# Patient Record
Sex: MALE | Race: OTHER | HISPANIC OR LATINO | URBAN - METROPOLITAN AREA
[De-identification: names, ages, dates, MRNs, and addresses within clinical notes are randomized per-mention and may not be internally consistent; named-entity substitution may affect disease eponyms.]

---

## 2017-05-26 ENCOUNTER — EMERGENCY (EMERGENCY)
Facility: HOSPITAL | Age: 72
LOS: 1 days | Discharge: HOME | End: 2017-05-26
Attending: EMERGENCY MEDICINE | Admitting: EMERGENCY MEDICINE
Payer: OTHER MISCELLANEOUS

## 2017-05-26 VITALS
SYSTOLIC BLOOD PRESSURE: 111 MMHG | DIASTOLIC BLOOD PRESSURE: 74 MMHG | RESPIRATION RATE: 14 BRPM | HEART RATE: 88 BPM | TEMPERATURE: 99 F | OXYGEN SATURATION: 100 %

## 2017-05-26 VITALS — HEIGHT: 70 IN | WEIGHT: 160.06 LBS

## 2017-05-26 DIAGNOSIS — S62.666A NONDISPLACED FRACTURE OF DISTAL PHALANX OF RIGHT LITTLE FINGER, INITIAL ENCOUNTER FOR CLOSED FRACTURE: ICD-10-CM

## 2017-05-26 DIAGNOSIS — Z79.899 OTHER LONG TERM (CURRENT) DRUG THERAPY: ICD-10-CM

## 2017-05-26 DIAGNOSIS — M79.644 PAIN IN RIGHT FINGER(S): ICD-10-CM

## 2017-05-26 PROCEDURE — 73130 X-RAY EXAM OF HAND: CPT | Mod: 26,RT

## 2017-05-26 PROCEDURE — 26750 TREAT FINGER FRACTURE EACH: CPT | Mod: 54

## 2017-05-26 PROCEDURE — 99284 EMERGENCY DEPT VISIT MOD MDM: CPT | Mod: 25

## 2017-05-26 RX ORDER — METFORMIN HYDROCHLORIDE 850 MG/1
0 TABLET ORAL
Qty: 0 | Refills: 0 | COMMUNITY

## 2017-05-26 NOTE — ED PROVIDER NOTE - MUSCULOSKELETAL, MLM
Swelling and tenderness along the right 5th DIP, pain with flexion at the DIP, no PIP or MCP tenderness.

## 2017-05-26 NOTE — ED PROVIDER NOTE - CARE PLAN
Principal Discharge DX:	Closed nondisplaced fracture of distal phalanx of right little finger, initial encounter

## 2017-05-26 NOTE — ED ADULT NURSE NOTE - CHPI ED SYMPTOMS NEG
no weakness/no vomiting/no chills/no decreased eating/drinking/no numbness/no dizziness/no tingling/no fever/no nausea

## 2017-05-26 NOTE — ED PROVIDER NOTE - NS ED MD SCRIBE ATTENDING SCRIBE SECTIONS
VITAL SIGNS( Pullset)/HISTORY OF PRESENT ILLNESS/PHYSICAL EXAM/PAST MEDICAL/SURGICAL/SOCIAL HISTORY/HIV/INTAKE ASSESSMENT/SCREENINGS/REVIEW OF SYSTEMS

## 2017-05-26 NOTE — ED PROVIDER NOTE - OBJECTIVE STATEMENT
71y RHD M Pt presents to ED with tenderness and swelling to right 5th finger s/p trauma yesterday. Pt states he was moving heavy crates when he extended his finger and felt pain. Unsure if he heard a popping sound or felt a popping sensation. Swelling began later that evening and Pt presents today for evaluation.

## 2017-05-26 NOTE — ED PROVIDER NOTE - MEDICAL DECISION MAKING DETAILS
71y RHD M with right 5th distal finger injury, occurred yesterday, unknown mechanism of injury. Pain and swelling limited to DIP with limited flexion due to pain. Possible FDP injury. Ice and x-ray ordered.

## 2017-05-26 NOTE — ED PROVIDER NOTE - DIAGNOSTIC INTERPRETATION
X-ray right hand, 3 views - INTERPRETATION: fracture to distal right 5th phalanx, positive soft tissue swelling, no dislocations

## 2017-05-26 NOTE — ED ADULT NURSE NOTE - OBJECTIVE STATEMENT
" I was moving cases of wine and beer when I hurt my finger" pt. able to move digits. c/o right pinky and hand pain

## 2018-01-10 ENCOUNTER — EMERGENCY (EMERGENCY)
Facility: HOSPITAL | Age: 73
LOS: 1 days | Discharge: HOME | End: 2018-01-10
Attending: EMERGENCY MEDICINE | Admitting: EMERGENCY MEDICINE
Payer: COMMERCIAL

## 2018-01-10 VITALS
SYSTOLIC BLOOD PRESSURE: 111 MMHG | DIASTOLIC BLOOD PRESSURE: 71 MMHG | HEART RATE: 66 BPM | RESPIRATION RATE: 18 BRPM | OXYGEN SATURATION: 99 % | TEMPERATURE: 98 F

## 2018-01-10 DIAGNOSIS — E11.9 TYPE 2 DIABETES MELLITUS WITHOUT COMPLICATIONS: ICD-10-CM

## 2018-01-10 DIAGNOSIS — R07.81 PLEURODYNIA: ICD-10-CM

## 2018-01-10 DIAGNOSIS — Z79.899 OTHER LONG TERM (CURRENT) DRUG THERAPY: ICD-10-CM
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
## 2018-01-10 DIAGNOSIS — S22.31XA FRACTURE OF ONE RIB, RIGHT SIDE, INITIAL ENCOUNTER FOR CLOSED FRACTURE: ICD-10-CM
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
## 2018-01-10 PROCEDURE — 71101 X-RAY EXAM UNILAT RIBS/CHEST: CPT | Mod: 26,RT

## 2018-01-10 PROCEDURE — 99284 EMERGENCY DEPT VISIT MOD MDM: CPT

## 2018-01-10 RX ORDER — OXYCODONE HYDROCHLORIDE 5 MG/1
1 TABLET ORAL
Qty: 20 | Refills: 0 | OUTPATIENT
Start: 2018-01-10 | End: 2018-01-14

## 2018-01-10 RX ORDER — IBUPROFEN 200 MG
1 TABLET ORAL
Qty: 28 | Refills: 0 | OUTPATIENT
Start: 2018-01-10 | End: 2018-01-16

## 2018-01-10 NOTE — ED PROVIDER NOTE - OBJECTIVE STATEMENT
72y M with hx of cirrhosis, presents to ED with right rib pain s/p fall. Pt states he slipped and fell 4 days ago, falling onto his right side. No LOC, no head trauma. He felt no pain until yesterday, when he bent over to get something at work, which caused him to feel pain in his right ribs. Pt states he found it difficult to sleep on his right side last night. No f/c, no pain when he takes deep breaths.

## 2018-01-10 NOTE — ED ADULT NURSE NOTE - CHPI ED SYMPTOMS NEG
no tingling/no bleeding/no deformity/no abrasion/no vomiting/no numbness/no confusion/no loss of consciousness/no weakness/no fever

## 2018-01-10 NOTE — ED ADULT TRIAGE NOTE - CHIEF COMPLAINT QUOTE
Patient to ED with right sided rib and back pain s/p fall on Saturday.  Pain worsening.  No distress

## 2018-06-08 ENCOUNTER — EMERGENCY (EMERGENCY)
Facility: HOSPITAL | Age: 73
LOS: 1 days | Discharge: HOME | End: 2018-06-08
Admitting: EMERGENCY MEDICINE
Payer: COMMERCIAL

## 2018-06-08 VITALS
TEMPERATURE: 98 F | SYSTOLIC BLOOD PRESSURE: 102 MMHG | RESPIRATION RATE: 20 BRPM | DIASTOLIC BLOOD PRESSURE: 65 MMHG | WEIGHT: 160.06 LBS | OXYGEN SATURATION: 100 % | HEART RATE: 58 BPM

## 2018-06-08 LAB
ALBUMIN SERPL ELPH-MCNC: 3.8 G/DL — SIGNIFICANT CHANGE UP (ref 3.4–5)
ALP SERPL-CCNC: 52 U/L — SIGNIFICANT CHANGE UP (ref 40–120)
ALT FLD-CCNC: 32 U/L — SIGNIFICANT CHANGE UP (ref 12–42)
ANION GAP SERPL CALC-SCNC: 8 MMOL/L — LOW (ref 9–16)
APTT BLD: 26.1 SEC — LOW (ref 27.5–36.5)
AST SERPL-CCNC: 28 U/L — SIGNIFICANT CHANGE UP (ref 15–37)
BASOPHILS NFR BLD AUTO: 0.5 % — SIGNIFICANT CHANGE UP (ref 0–2)
BILIRUB SERPL-MCNC: 0.4 MG/DL — SIGNIFICANT CHANGE UP (ref 0.2–1.2)
BUN SERPL-MCNC: 16 MG/DL — SIGNIFICANT CHANGE UP (ref 7–23)
CALCIUM SERPL-MCNC: 9 MG/DL — SIGNIFICANT CHANGE UP (ref 8.5–10.5)
CHLORIDE SERPL-SCNC: 104 MMOL/L — SIGNIFICANT CHANGE UP (ref 96–108)
CO2 SERPL-SCNC: 25 MMOL/L — SIGNIFICANT CHANGE UP (ref 22–31)
CREAT SERPL-MCNC: 0.96 MG/DL — SIGNIFICANT CHANGE UP (ref 0.5–1.3)
EOSINOPHIL NFR BLD AUTO: 1.2 % — SIGNIFICANT CHANGE UP (ref 0–6)
GLUCOSE SERPL-MCNC: 196 MG/DL — HIGH (ref 70–99)
HCT VFR BLD CALC: 46.2 % — SIGNIFICANT CHANGE UP (ref 39–50)
HGB BLD-MCNC: 15.1 G/DL — SIGNIFICANT CHANGE UP (ref 13–17)
IMM GRANULOCYTES NFR BLD AUTO: 0.4 % — SIGNIFICANT CHANGE UP (ref 0–1.5)
INR BLD: 1 — SIGNIFICANT CHANGE UP (ref 0.88–1.16)
LACTATE SERPL-SCNC: 2 MMOL/L — SIGNIFICANT CHANGE UP (ref 0.4–2)
LIDOCAIN IGE QN: 199 U/L — SIGNIFICANT CHANGE UP (ref 73–393)
LYMPHOCYTES # BLD AUTO: 13.6 % — SIGNIFICANT CHANGE UP (ref 13–44)
MCHC RBC-ENTMCNC: 29.6 PG — SIGNIFICANT CHANGE UP (ref 27–34)
MCHC RBC-ENTMCNC: 32.7 G/DL — SIGNIFICANT CHANGE UP (ref 32–36)
MCV RBC AUTO: 90.6 FL — SIGNIFICANT CHANGE UP (ref 80–100)
MONOCYTES NFR BLD AUTO: 4.3 % — SIGNIFICANT CHANGE UP (ref 2–14)
NEUTROPHILS NFR BLD AUTO: 80 % — HIGH (ref 43–77)
PLATELET # BLD AUTO: 137 K/UL — LOW (ref 150–400)
POTASSIUM SERPL-MCNC: 3.9 MMOL/L — SIGNIFICANT CHANGE UP (ref 3.5–5.3)
POTASSIUM SERPL-SCNC: 3.9 MMOL/L — SIGNIFICANT CHANGE UP (ref 3.5–5.3)
PROT SERPL-MCNC: 7.9 G/DL — SIGNIFICANT CHANGE UP (ref 6.4–8.2)
PROTHROM AB SERPL-ACNC: 11 SEC — SIGNIFICANT CHANGE UP (ref 9.8–12.7)
RBC # BLD: 5.1 M/UL — SIGNIFICANT CHANGE UP (ref 4.2–5.8)
RBC # FLD: 12.4 % — SIGNIFICANT CHANGE UP (ref 10.3–16.9)
SODIUM SERPL-SCNC: 137 MMOL/L — SIGNIFICANT CHANGE UP (ref 132–145)
TROPONIN I SERPL-MCNC: <0.017 NG/ML — LOW (ref 0.02–0.06)
WBC # BLD: 12.9 K/UL — HIGH (ref 3.8–10.5)
WBC # FLD AUTO: 12.9 K/UL — HIGH (ref 3.8–10.5)

## 2018-06-08 PROCEDURE — 99285 EMERGENCY DEPT VISIT HI MDM: CPT | Mod: 25

## 2018-06-08 PROCEDURE — 71045 X-RAY EXAM CHEST 1 VIEW: CPT | Mod: 26

## 2018-06-08 PROCEDURE — 93010 ELECTROCARDIOGRAM REPORT: CPT

## 2018-06-08 PROCEDURE — 74177 CT ABD & PELVIS W/CONTRAST: CPT | Mod: 26

## 2018-06-08 RX ORDER — ONDANSETRON 8 MG/1
4 TABLET, FILM COATED ORAL ONCE
Qty: 0 | Refills: 0 | Status: COMPLETED | OUTPATIENT
Start: 2018-06-08 | End: 2018-06-08

## 2018-06-08 RX ORDER — MOXIFLOXACIN HYDROCHLORIDE TABLETS, 400 MG 400 MG/1
1 TABLET, FILM COATED ORAL
Qty: 13 | Refills: 0 | OUTPATIENT
Start: 2018-06-08 | End: 2018-06-14

## 2018-06-08 RX ORDER — CIPROFLOXACIN LACTATE 400MG/40ML
500 VIAL (ML) INTRAVENOUS ONCE
Qty: 0 | Refills: 0 | Status: COMPLETED | OUTPATIENT
Start: 2018-06-08 | End: 2018-06-08

## 2018-06-08 RX ORDER — KETOROLAC TROMETHAMINE 30 MG/ML
15 SYRINGE (ML) INJECTION ONCE
Qty: 0 | Refills: 0 | Status: DISCONTINUED | OUTPATIENT
Start: 2018-06-08 | End: 2018-06-08

## 2018-06-08 RX ORDER — METRONIDAZOLE 500 MG
1 TABLET ORAL
Qty: 13 | Refills: 0 | OUTPATIENT
Start: 2018-06-08 | End: 2018-06-14

## 2018-06-08 RX ORDER — IOHEXOL 300 MG/ML
50 INJECTION, SOLUTION INTRAVENOUS ONCE
Qty: 0 | Refills: 0 | Status: COMPLETED | OUTPATIENT
Start: 2018-06-08 | End: 2018-06-08

## 2018-06-08 RX ORDER — METRONIDAZOLE 500 MG
500 TABLET ORAL ONCE
Qty: 0 | Refills: 0 | Status: COMPLETED | OUTPATIENT
Start: 2018-06-08 | End: 2018-06-08

## 2018-06-08 RX ADMIN — ONDANSETRON 4 MILLIGRAM(S): 8 TABLET, FILM COATED ORAL at 07:24

## 2018-06-08 RX ADMIN — IOHEXOL 50 MILLILITER(S): 300 INJECTION, SOLUTION INTRAVENOUS at 07:35

## 2018-06-08 RX ADMIN — Medication 500 MILLIGRAM(S): at 11:30

## 2018-06-08 RX ADMIN — Medication 15 MILLIGRAM(S): at 07:24

## 2018-06-08 RX ADMIN — Medication 15 MILLIGRAM(S): at 07:39

## 2018-06-08 NOTE — ED ADULT NURSE NOTE - OBJECTIVE STATEMENT
pt presents to ED with c/o bloating and diffuse abdominal pain, hx of hep c treated with Harvoni & prostatectomy. Pt reports mild nausea, abdomen diffusely tender and softly distended.

## 2018-06-08 NOTE — ED ADULT TRIAGE NOTE - CHIEF COMPLAINT QUOTE
Pt ambulatory, complaining of sudden onset diffused abdominal pain this morning; with nausea, no vomiting. Reports hx of cirrhosis.

## 2018-06-08 NOTE — ED PROVIDER NOTE - OBJECTIVE STATEMENT
72 year old male with h/o cirrhosis and DM presents with onset of upper abdominal pain x this morning. he reports pain began after he ate breakfast, went to work and felt like pain was improving then worsened again so decided to come to ED. reports no history of pain like this in the past. associated with mild nausea.  Denies fever, chills, N/V/D/C, melena, hematochezia, hematuria, change in urinary/bowel function, dysuria, d/c, flank pain, HA, dizziness, focal weakness, CP, SOB, palpitations, cough, and malaise.

## 2018-06-08 NOTE — ED PROVIDER NOTE - PHYSICAL EXAMINATION
VITAL SIGNS: I have reviewed nursing notes and confirm.  CONSTITUTIONAL: Well-developed; well-nourished; in no acute distress.  SKIN: Skin is warm and dry, no acute rash.  HEAD: Normocephalic; atraumatic.  EYES: PERRL, EOM intact; conjunctiva and sclera clear.  ENT: No nasal discharge; airway clear.  NECK: Supple; non tender.  CARD: S1, S2 normal; no murmurs, gallops, or rubs. Regular rate and rhythm.  RESP: No wheezes, rales or rhonchi.  ABD: Normal bowel sounds; soft; non-distended; diffuse abdominal tenderness, no guarding, no rebound tenderness; no hepatosplenomegaly.  EXT: Normal ROM. No clubbing, cyanosis or edema.  NEURO: Alert, oriented. Grossly unremarkable.  PSYCH: Cooperative, appropriate.

## 2018-06-08 NOTE — ED PROVIDER NOTE - PROGRESS NOTE DETAILS
SEGUNDO Morales endorsed patient to day team pending labs, re-evaluation, and disposition SEGUNDO Morales endorsed patient to day team pending labs/CT/CXR, re-evaluation, and disposition CT shows functional ileus with enterocolitis.  WBC 12.  Will treat as infectious with cipro and flagyl  Pt tolerating PO.  Will discharge home with GI referral and return precautions.

## 2018-06-08 NOTE — ED ADULT NURSE NOTE - CHPI ED SYMPTOMS NEG
no abdominal distension/no vomiting/no dysuria/no diarrhea/no fever/no chills/no blood in stool/no hematuria/no burning urination

## 2018-06-08 NOTE — ED PROVIDER NOTE - NS ED ROS FT
· CONSTITUTIONAL: no fever and no chills.  · CARDIOVASCULAR: normal rate and rhythm, no chest pain and no edema.  · RESPIRATORY: no chest pain, no cough, and no shortness of breath.  · GASTROINTESTINAL: + abdominal pain, no bloating, no constipation, no diarrhea, + nausea and no vomiting.  · MUSCULOSKELETAL: no back pain, no musculoskeletal pain, no neck pain, and no weakness.  · SKIN: no abrasions, no jaundice, no lesions, no pruritis, and no rashes.  · NEURO: no loss of consciousness, no gait abnormality, no headache, no sensory deficits, and no weakness.  · PSYCHIATRIC: no known mental health issues.

## 2018-06-12 DIAGNOSIS — Z79.84 LONG TERM (CURRENT) USE OF ORAL HYPOGLYCEMIC DRUGS: ICD-10-CM

## 2018-06-12 DIAGNOSIS — E11.9 TYPE 2 DIABETES MELLITUS WITHOUT COMPLICATIONS: ICD-10-CM

## 2018-06-12 DIAGNOSIS — Z79.891 LONG TERM (CURRENT) USE OF OPIATE ANALGESIC: ICD-10-CM

## 2018-06-12 DIAGNOSIS — Z79.1 LONG TERM (CURRENT) USE OF NON-STEROIDAL ANTI-INFLAMMATORIES (NSAID): ICD-10-CM

## 2018-06-12 DIAGNOSIS — K52.9 NONINFECTIVE GASTROENTERITIS AND COLITIS, UNSPECIFIED: ICD-10-CM

## 2018-06-12 DIAGNOSIS — R10.10 UPPER ABDOMINAL PAIN, UNSPECIFIED: ICD-10-CM

## 2019-04-16 ENCOUNTER — EMERGENCY (EMERGENCY)
Facility: HOSPITAL | Age: 74
LOS: 1 days | Discharge: HOME | End: 2019-04-16
Admitting: EMERGENCY MEDICINE
Payer: COMMERCIAL

## 2019-04-16 VITALS
HEART RATE: 64 BPM | RESPIRATION RATE: 16 BRPM | WEIGHT: 169.98 LBS | TEMPERATURE: 98 F | DIASTOLIC BLOOD PRESSURE: 66 MMHG | SYSTOLIC BLOOD PRESSURE: 98 MMHG | OXYGEN SATURATION: 95 %

## 2019-04-16 PROCEDURE — 73660 X-RAY EXAM OF TOE(S): CPT | Mod: 26,LT

## 2019-04-16 PROCEDURE — 99283 EMERGENCY DEPT VISIT LOW MDM: CPT

## 2019-04-16 RX ORDER — CEPHALEXIN 500 MG
1 CAPSULE ORAL
Qty: 28 | Refills: 0 | OUTPATIENT
Start: 2019-04-16 | End: 2019-04-22

## 2019-04-16 NOTE — ED PROVIDER NOTE - CLINICAL SUMMARY MEDICAL DECISION MAKING FREE TEXT BOX
patient PMHx DM with L 5th toe pain after hitting foot against edge of wall. digit edematous, + erythema, XR WNL. will tx as early cellulitis. advised close follow up with PCP this week

## 2019-04-16 NOTE — ED PROVIDER NOTE - PHYSICAL EXAMINATION
L foot: edema over 5th DIP with dry abrasion in the web space btw 4th and 5th toe, TTP, dry, mild erythema  DP pulses equal. cap refill < 2 sec

## 2019-04-16 NOTE — ED PROVIDER NOTE - OBJECTIVE STATEMENT
PMHx DM presents with L 5th toe pain and swelling x 4 days after running into the edge of a door between the webspace of 4th and 5th toe. denies paresthesias or focal weakness. denies fever, chills, nightsweats

## 2019-04-17 PROBLEM — K74.60 UNSPECIFIED CIRRHOSIS OF LIVER: Chronic | Status: ACTIVE | Noted: 2017-05-26

## 2019-04-17 PROBLEM — E11.9 TYPE 2 DIABETES MELLITUS WITHOUT COMPLICATIONS: Chronic | Status: ACTIVE | Noted: 2018-01-10

## 2019-04-18 NOTE — ED ADULT NURSE NOTE - NSFALLRSKASSESSDT_ED_ALL_ED
I spoke with Mildred and verbalized understanding of message.  
Mildred from physical therapy called office requesting for pt to continue with physical therapy. She states pt still need work on balance and strengthening. She can be reach at [phone] she can take verbal order as well.   
Okay to continue physical therapy  
16-Apr-2019 17:05

## 2019-04-18 NOTE — ED POST DISCHARGE NOTE - DETAILS
This writer spoke to the patient after his identity was confirmed and patient stated he came to the Ed yesterday and received a copy of them

## 2019-04-20 DIAGNOSIS — Z79.1 LONG TERM (CURRENT) USE OF NON-STEROIDAL ANTI-INFLAMMATORIES (NSAID): ICD-10-CM

## 2019-04-20 DIAGNOSIS — M79.675 PAIN IN LEFT TOE(S): ICD-10-CM

## 2019-04-20 DIAGNOSIS — L03.032 CELLULITIS OF LEFT TOE: ICD-10-CM

## 2019-04-20 DIAGNOSIS — E11.9 TYPE 2 DIABETES MELLITUS WITHOUT COMPLICATIONS: ICD-10-CM

## 2019-04-20 DIAGNOSIS — Z79.2 LONG TERM (CURRENT) USE OF ANTIBIOTICS: ICD-10-CM

## 2019-11-25 NOTE — ED ADULT TRIAGE NOTE - MEANS OF ARRIVAL
Patient states she was told to see dr Christiano Lombardi, when called she was told she needs an referral to schedule first and that she would not be able to get seen until January. Patient would like that  referral to be put in. Patient can be reached at [phone] to discuss.    ambulatory

## 2021-09-07 ENCOUNTER — EMERGENCY (EMERGENCY)
Facility: HOSPITAL | Age: 76
LOS: 1 days | Discharge: HOME | End: 2021-09-07
Attending: EMERGENCY MEDICINE | Admitting: EMERGENCY MEDICINE
Payer: MEDICARE

## 2021-09-07 VITALS
DIASTOLIC BLOOD PRESSURE: 70 MMHG | WEIGHT: 154.98 LBS | TEMPERATURE: 98 F | HEART RATE: 68 BPM | SYSTOLIC BLOOD PRESSURE: 118 MMHG | HEIGHT: 70 IN | OXYGEN SATURATION: 95 % | RESPIRATION RATE: 16 BRPM

## 2021-09-07 DIAGNOSIS — K74.60 UNSPECIFIED CIRRHOSIS OF LIVER: ICD-10-CM

## 2021-09-07 DIAGNOSIS — L02.512 CUTANEOUS ABSCESS OF LEFT HAND: ICD-10-CM

## 2021-09-07 DIAGNOSIS — E11.9 TYPE 2 DIABETES MELLITUS WITHOUT COMPLICATIONS: ICD-10-CM

## 2021-09-07 DIAGNOSIS — L72.3 SEBACEOUS CYST: ICD-10-CM

## 2021-09-07 PROCEDURE — 99283 EMERGENCY DEPT VISIT LOW MDM: CPT

## 2021-09-07 RX ORDER — AZTREONAM 2 G
1 VIAL (EA) INJECTION
Qty: 14 | Refills: 0
Start: 2021-09-07 | End: 2021-09-13

## 2021-09-07 RX ORDER — CEPHALEXIN 500 MG
1 CAPSULE ORAL
Qty: 28 | Refills: 0
Start: 2021-09-07 | End: 2021-09-13

## 2021-09-07 RX ORDER — CEPHALEXIN 500 MG
500 CAPSULE ORAL ONCE
Refills: 0 | Status: COMPLETED | OUTPATIENT
Start: 2021-09-07 | End: 2021-09-07

## 2021-09-07 RX ADMIN — Medication 500 MILLIGRAM(S): at 17:19

## 2021-09-07 RX ADMIN — Medication 1 TABLET(S): at 17:18

## 2021-09-07 NOTE — ED PROVIDER NOTE - PROVIDER TOKENS
PROVIDER:[TOKEN:[[id number]],FOLLOWUP:[1-3 Days]],PROVIDER:[TOKEN:[[id number]],FOLLOWUP:[1-3 Days]]

## 2021-09-07 NOTE — ED PROVIDER NOTE - OBJECTIVE STATEMENT
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  Pt p/w ~1cm swelling to his L 1st web space.  Pt reports an abscess to the exact same location in the past and caused LUE cellulitis.  Pt noted swelling a few days ago.  Denies any trauma to the area.  Denies any redness, streaking, fevers, chills, or other concerns.

## 2021-09-07 NOTE — ED PROVIDER NOTE - CLINICAL SUMMARY MEDICAL DECISION MAKING FREE TEXT BOX
Cystic structure I&D'd.  Cheesy wax like material with minimal pus obtained.  Concerning for sebaceous cyst.  Too small to pack.  Started on PO Abx.  Will have him return here in 48 hours for wound check.  Strict instructions given to f/u with hand surgeon as cyst is likely to recur.  Infectious return precautions discussed.

## 2021-09-07 NOTE — ED ADULT TRIAGE NOTE - CHIEF COMPLAINT QUOTE
Pt presenting with swelling and tenderness to area between Lt index finger and thumb. Reports developing abscess to same area in the past which evolved into cellulitis.

## 2021-09-07 NOTE — ED PROVIDER NOTE - PATIENT PORTAL LINK FT
You can access the FollowMyHealth Patient Portal offered by [facility] by registering at the following website: [email or web address]. By joining [employer]’s FollowMyHealth portal, you will also be able to view your health information using other applications (apps) compatible with our system.

## 2021-09-07 NOTE — ED ADULT NURSE NOTE - NSIMPLEMENTINTERV_GEN_ALL_ED
Implemented All Universal Safety Interventions:  [place] to call system. Call bell, personal items and telephone within reach. Instruct patient to call for assistance. Room bathroom lighting operational. Non-slip footwear when patient is off stretcher. Physically safe environment: no spills, clutter or unnecessary equipment. Stretcher in lowest position, wheels locked, appropriate side rails in place.

## 2021-09-07 NOTE — ED PROVIDER NOTE - CARE PROVIDER_API CALL
Romario Lee)  Plastic Surgery; Surgery  [address] [place] [address]  Phone: [phone]  Fax: [phone]  Follow Up Time: 1-3 Days    Nestor Loja)  Plastic Surgery  [address], [address]  [place] [address]  Phone: [phone]  Fax: [phone]  Follow Up Time: 1-3 Days

## 2021-09-07 NOTE — ED PROVIDER NOTE - NSFOLLOWUPINSTRUCTIONS_ED_ALL_ED_FT
PLEASE RETURN IN 2 DAYS FOR A WOUND CHECK.     PLEASE GO TO YOUR PHARMACY TO FILL YOUR PRESCRIPTIONS.  PLEASE START TODAY AND USE AS DIRECTED.    PLEASE RETURN TO THE ER IMMEDIATELY FOR ANY REDNESS, SWELLING, SEVERE PAIN, PUS DRAINAGE, FEVER, OR ANY OTHER CONCERNS FOR INFECTION/WORSENING.    PLEASE FOLLOW UP WITH ONE OF OUR HAND DOCTORS (SEE ABOVE) AS THIS CYST IS LIKELY TO COME BACK.

## 2021-09-07 NOTE — ED PROVIDER NOTE - PHYSICAL EXAMINATION
VITAL SIGNS: I have reviewed nursing notes and confirm.  CONSTITUTIONAL: Well-developed; well-nourished; in no acute distress.  SKIN: ~1x1cm spherical   HEAD: Normocephalic; atraumatic.  EYES: PERRL, EOM intact; conjunctiva and sclera clear.  ENT: No nasal discharge; airway clear.  NECK: Supple; non tender.  CARD: S1, S2 normal; no murmurs, gallops, or rubs. Regular rate and rhythm.  RESP: No wheezes, rales or rhonchi.  ABD: Normal bowel sounds; soft; non-distended; non-tender; no hepatosplenomegaly.  MSK: Normal ROM. No clubbing, cyanosis or edema.  NEURO: Alert, oriented. Grossly unremarkable.  PSYCH: Cooperative, appropriate. VITAL SIGNS: I have reviewed nursing notes and confirm.  CONSTITUTIONAL: Well-developed; well-nourished; in no acute distress.  SKIN: ~1x1cm spherical cyst to L 1st webspace with mild induration and minimal erythema (<1cm).    HEAD: Normocephalic; atraumatic.  EYES: PERRL, EOM intact; conjunctiva and sclera clear.  ENT: No nasal discharge; airway clear.  NECK: Supple; non tender.  CARD: S1, S2 normal; no murmurs, gallops, or rubs. Regular rate and rhythm.  RESP: No wheezes, rales or rhonchi.  ABD: Normal bowel sounds; soft; non-distended; non-tender; no hepatosplenomegaly.  MSK: Normal ROM. No clubbing, cyanosis or edema.  NEURO: Alert, oriented. Grossly unremarkable.  PSYCH: Cooperative, appropriate.

## 2021-10-10 ENCOUNTER — TRANSCRIPTION ENCOUNTER (OUTPATIENT)
Age: 76
End: 2021-10-10

## 2021-10-11 ENCOUNTER — OUTPATIENT (OUTPATIENT)
Dept: OUTPATIENT SERVICES | Facility: HOSPITAL | Age: 76
LOS: 1 days | Discharge: HOME | End: 2021-10-11
Payer: MEDICARE

## 2021-10-11 ENCOUNTER — RESULT REVIEW (OUTPATIENT)
Age: 76
End: 2021-10-11

## 2021-10-11 LAB — GLUCOSE BLDC GLUCOMTR-MCNC: 135 MG/DL — HIGH (ref 70–99)

## 2021-10-11 PROCEDURE — 88307 TISSUE EXAM BY PATHOLOGIST: CPT | Mod: 26

## 2021-10-21 LAB — SURGICAL PATHOLOGY STUDY: SIGNIFICANT CHANGE UP

## 2022-02-08 ENCOUNTER — EMERGENCY (EMERGENCY)
Facility: HOSPITAL | Age: 77
LOS: 1 days | Discharge: HOME | End: 2022-02-08
Attending: EMERGENCY MEDICINE | Admitting: EMERGENCY MEDICINE
Payer: MEDICARE

## 2022-02-08 VITALS
WEIGHT: 160.06 LBS | HEART RATE: 59 BPM | RESPIRATION RATE: 18 BRPM | SYSTOLIC BLOOD PRESSURE: 156 MMHG | TEMPERATURE: 98 F | DIASTOLIC BLOOD PRESSURE: 83 MMHG | OXYGEN SATURATION: 98 % | HEIGHT: 70 IN
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
## 2022-02-08 DIAGNOSIS — M79.674 PAIN IN RIGHT TOE(S): ICD-10-CM
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
## 2022-02-08 DIAGNOSIS — S90.931A UNSPECIFIED SUPERFICIAL INJURY OF RIGHT GREAT TOE, INITIAL ENCOUNTER: ICD-10-CM

## 2022-02-08 PROCEDURE — 73630 X-RAY EXAM OF FOOT: CPT | Mod: 26,RT

## 2022-02-08 PROCEDURE — 99284 EMERGENCY DEPT VISIT MOD MDM: CPT | Mod: 25

## 2022-02-08 NOTE — ED PROVIDER NOTE - PHYSICAL EXAMINATION
CONSTITUTIONAL: Well appearing, well nourished, awake, alert, oriented to person, place, time/situation and in no apparent distress.  ENT: Airway patent, Nasal mucosa clear. Mouth with normal mucosa.  EYES: Clear bilaterally.  RESPIRATORY: Breathing comfortably with normal RR.  MSK: mild ecchymosis to R 4th toe with Tenderness to palpation. ROM is fully intact. Brisk cap refill throughout.    NEURO: Alert and oriented, no focal deficits.  SKIN: Skin normal color for race, warm, dry and intact. No evidence of rash.  PSYCH: Alert and oriented to person, place, time/situation. normal mood and affect. no apparent risk to self or others.

## 2022-02-08 NOTE — ED PROVIDER NOTE - PATIENT PORTAL LINK FT
You can access the FollowMyHealth Patient Portal offered by [facility] by registering at the following website: [email or web address]. By joining [employer]’s FollowMyHealth portal, you will also be able to view your health information using other applications (apps) compatible with our system.

## 2022-02-08 NOTE — ED PROVIDER NOTE - OBJECTIVE STATEMENT
77 y/o M with PMH of diabetes mellitus presents to the ED for R 4th toe pain. Pt reports he struck his toe against a cabinet earlier this morning. He now c/o pain and swelling to the area. Pt denies numbness, tingling, weakness, or any additional injuries.

## 2022-02-08 NOTE — ED ADULT NURSE NOTE - NSIMPLEMENTINTERV_GEN_ALL_ED
Implemented All Fall Risk Interventions:  [place] to call system. Call bell, personal items and telephone within reach. Instruct patient to call for assistance. Room bathroom lighting operational. Non-slip footwear when patient is off stretcher. Physically safe environment: no spills, clutter or unnecessary equipment. Stretcher in lowest position, wheels locked, appropriate side rails in place. Provide visual cue, wrist band, yellow gown, etc. Monitor gait and stability. Monitor for mental status changes and reorient to person, place, and time. Review medications for side effects contributing to fall risk. Reinforce activity limits and safety measures with patient and family.

## 2022-02-08 NOTE — ED PROVIDER NOTE - CARE PROVIDER_API CALL
Ray Slater)  Orthopaedic Surgery  [address], [address] [place] [address]  Phone: [phone]  Fax: [phone]  Follow Up Time: 7-10 Days

## 2022-02-08 NOTE — ED ADULT NURSE NOTE - OBJECTIVE STATEMENT
Pt presents to ED sp stubbing R fourth toe on box this am. Pt is diabetic and is worried about potential for infection. Skin appears intact with moderate erythema and swelling to tip of toe. Demonstrates ability to move toes without issue. Ambulates steadily. Has not taken any medication for pain due to Hx of cirrhosis. Denies CP or SOB.

## 2022-02-08 NOTE — ED PROVIDER NOTE - NSFOLLOWUPINSTRUCTIONS_ED_ALL_ED_FT
Please continue to caryn tape the 4th toe to the 3rd toe and wear the orthopedic shoe for 1-2 weeks.      If you have prolonged issues or pain with the toe, please follow-up with our foot and ankle specialist, Dr. Slater.  See above for his clinic information.     PLEASE ICE THE AREA OF PAIN FOR THE NEXT 2 DAYS.  ICE FOR 20 MINUTES AT A TIME AT LEAST 3-4 TIMES A DAY.

## 2022-02-08 NOTE — ED ADULT TRIAGE NOTE - CHIEF COMPLAINT QUOTE
Pt presents to ED co right foot 4th digit toe pain and swelling s/p hitting it on cabinet this morning. Ambulating with steady gait

## 2022-02-08 NOTE — ED ADULT NURSE NOTE - NS ED NOTE ABUSE SUSPICION NEGLECT YN
"Anesthesia Post Evaluation    Patient: Yvette Reyes    Procedure(s) Performed: Procedure(s) (LRB):  FISTULOTOMY, seton, lithotomy (N/A)    Final Anesthesia Type: general  Patient location during evaluation: PACU  Patient participation: Yes- Able to Participate  Level of consciousness: awake and alert  Post-procedure vital signs: reviewed and stable  Pain management: adequate  Airway patency: patent  PONV status at discharge: No PONV  Anesthetic complications: no      Cardiovascular status: blood pressure returned to baseline  Respiratory status: unassisted, spontaneous ventilation and room air  Hydration status: euvolemic  Follow-up not needed.        Visit Vitals  BP (!) 115/59   Pulse (!) 49   Temp 36.6 °C (97.9 °F) (Temporal)   Resp 17   Ht 5' 2" (1.575 m)   Wt 88.9 kg (196 lb)   LMP 01/24/2018   SpO2 100%   Breastfeeding? Yes   BMI 35.85 kg/m²       Pain/Farideh Score: Pain Assessment Performed: Yes (1/25/2018  3:10 PM)  Presence of Pain: denies (1/25/2018  4:30 PM)  Pain Rating Prior to Med Admin: 5 (1/25/2018  2:17 PM)  Farideh Score: 10 (1/25/2018  3:00 PM)      " No

## 2022-02-08 NOTE — ED PROVIDER NOTE - CLINICAL SUMMARY MEDICAL DECISION MAKING FREE TEXT BOX
77 y/o M with Hx of diabetes mellitus presenting with R 4th toe pain and swelling. Exam is remarkable for mild ecchymosis to the R 4th toe with Tenderness to palpation. ROM is fully intact. Suspect for R 4th toe contusion versus Fx. Plan for XR imaging to r/o Fx. Will treat with caryn tape and orthopedic shoe. Pt declined offer for pain medications. Will reassess clinically after XR results have been obtained.

## 2022-02-11 PROBLEM — Z00.00 ENCOUNTER FOR PREVENTIVE HEALTH EXAMINATION: Status: ACTIVE | Noted: 2022-02-11

## 2022-02-11 NOTE — ED POST DISCHARGE NOTE - DETAILS
patient made aware of finding. patient will continue with surgical shoe and has an appointment to follow up with ortho

## 2022-02-15 ENCOUNTER — TRANSCRIPTION ENCOUNTER (OUTPATIENT)
Age: 77
End: 2022-02-15

## 2022-02-16 ENCOUNTER — RESULT REVIEW (OUTPATIENT)
Age: 77
End: 2022-02-16

## 2022-02-16 ENCOUNTER — APPOINTMENT (OUTPATIENT)
Dept: ORTHOPEDIC SURGERY | Facility: CLINIC | Age: 77
End: 2022-02-16
Payer: MEDICARE

## 2022-02-16 ENCOUNTER — OUTPATIENT (OUTPATIENT)
Dept: OUTPATIENT SERVICES | Facility: HOSPITAL | Age: 77
LOS: 1 days | End: 2022-02-16
Payer: MEDICARE

## 2022-02-16 VITALS — WEIGHT: 160 LBS | HEIGHT: 69 IN | RESPIRATION RATE: 16 BRPM | BODY MASS INDEX: 23.7 KG/M2

## 2022-02-16 DIAGNOSIS — Z85.46 PERSONAL HISTORY OF MALIGNANT NEOPLASM OF PROSTATE: ICD-10-CM

## 2022-02-16 DIAGNOSIS — Z78.9 OTHER SPECIFIED HEALTH STATUS: ICD-10-CM

## 2022-02-16 DIAGNOSIS — Z86.39 PERSONAL HISTORY OF OTHER ENDOCRINE, NUTRITIONAL AND METABOLIC DISEASE: ICD-10-CM

## 2022-02-16 PROCEDURE — 73610 X-RAY EXAM OF ANKLE: CPT | Mod: 26,RT

## 2022-02-16 PROCEDURE — 73630 X-RAY EXAM OF FOOT: CPT

## 2022-02-16 PROCEDURE — 73610 X-RAY EXAM OF ANKLE: CPT

## 2022-02-16 PROCEDURE — 99203 OFFICE O/P NEW LOW 30 MIN: CPT | Mod: 25

## 2022-02-16 PROCEDURE — 29540 STRAPPING ANKLE &/FOOT: CPT

## 2022-02-16 PROCEDURE — 73630 X-RAY EXAM OF FOOT: CPT | Mod: 26,RT

## 2022-02-16 RX ORDER — ATORVASTATIN CALCIUM 80 MG/1
TABLET, FILM COATED ORAL
Refills: 0 | Status: ACTIVE | COMMUNITY

## 2022-02-16 RX ORDER — METFORMIN HYDROCHLORIDE 625 MG/1
TABLET ORAL
Refills: 0 | Status: ACTIVE | COMMUNITY

## 2022-02-23 ENCOUNTER — NON-APPOINTMENT (OUTPATIENT)
Age: 77
End: 2022-02-23

## 2022-04-04 ENCOUNTER — NON-APPOINTMENT (OUTPATIENT)
Age: 77
End: 2022-04-04

## 2022-04-04 RX ORDER — CHOLECALCIFEROL (VITAMIN D3) 1250 MCG
1.25 MG CAPSULE ORAL
Qty: 4 | Refills: 0 | Status: ACTIVE | COMMUNITY
Start: 2022-02-16 | End: 1900-01-01

## 2022-05-18 ENCOUNTER — APPOINTMENT (OUTPATIENT)
Dept: ORTHOPEDIC SURGERY | Facility: CLINIC | Age: 77
End: 2022-05-18

## 2022-05-18 DIAGNOSIS — S92.531A DISPLACED FRACTURE OF DISTAL PHALANX OF RIGHT LESSER TOE(S), INITIAL ENCOUNTER FOR CLOSED FRACTURE: ICD-10-CM

## 2022-05-18 DIAGNOSIS — S92.522A DISPLACED FRACTURE OF MIDDLE PHALANX OF LEFT LESSER TOE(S), INITIAL ENCOUNTER FOR CLOSED FRACTURE: ICD-10-CM

## 2022-05-18 DIAGNOSIS — M79.671 PAIN IN RIGHT FOOT: ICD-10-CM

## 2022-05-18 PROCEDURE — 73630 X-RAY EXAM OF FOOT: CPT | Mod: 26,RT

## 2022-05-18 PROCEDURE — 73610 X-RAY EXAM OF ANKLE: CPT | Mod: 26,RT

## 2022-05-18 PROCEDURE — 99213 OFFICE O/P EST LOW 20 MIN: CPT

## 2022-05-19 ENCOUNTER — TRANSCRIPTION ENCOUNTER (OUTPATIENT)
Age: 77
End: 2022-05-19

## 2022-05-19 VITALS — BODY MASS INDEX: 23.7 KG/M2 | HEIGHT: 69 IN | RESPIRATION RATE: 16 BRPM | WEIGHT: 160 LBS

## 2022-05-20 ENCOUNTER — TRANSCRIPTION ENCOUNTER (OUTPATIENT)
Age: 77
End: 2022-05-20

## 2022-07-08 ENCOUNTER — RX RENEWAL (OUTPATIENT)
Age: 77
End: 2022-07-08

## 2022-09-13 ENCOUNTER — EMERGENCY (EMERGENCY)
Facility: HOSPITAL | Age: 77
LOS: 1 days | Discharge: HOME | End: 2022-09-13
Admitting: EMERGENCY MEDICINE

## 2022-09-13 VITALS
RESPIRATION RATE: 18 BRPM | TEMPERATURE: 98 F | HEIGHT: 70 IN | DIASTOLIC BLOOD PRESSURE: 87 MMHG | HEART RATE: 73 BPM | WEIGHT: 158.07 LBS | OXYGEN SATURATION: 96 % | SYSTOLIC BLOOD PRESSURE: 126 MMHG

## 2022-09-13 VITALS
SYSTOLIC BLOOD PRESSURE: 126 MMHG | DIASTOLIC BLOOD PRESSURE: 82 MMHG | OXYGEN SATURATION: 100 % | HEART RATE: 61 BPM | RESPIRATION RATE: 19 BRPM

## 2022-09-13 DIAGNOSIS — E11.9 TYPE 2 DIABETES MELLITUS WITHOUT COMPLICATIONS: ICD-10-CM
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
## 2022-09-13 DIAGNOSIS — K74.60 UNSPECIFIED CIRRHOSIS OF LIVER: ICD-10-CM

## 2022-09-13 DIAGNOSIS — S89.91XA UNSPECIFIED INJURY OF RIGHT LOWER LEG, INITIAL ENCOUNTER: ICD-10-CM
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
## 2022-09-13 DIAGNOSIS — M25.561 PAIN IN RIGHT KNEE: ICD-10-CM

## 2022-09-13 PROCEDURE — 99283 EMERGENCY DEPT VISIT LOW MDM: CPT

## 2022-09-13 PROCEDURE — 73562 X-RAY EXAM OF KNEE 3: CPT | Mod: 26,RT

## 2022-09-13 NOTE — ED ADULT NURSE NOTE - NSIMPLEMENTINTERV_GEN_ALL_ED
Implemented All Universal Safety Interventions:  [place] to call system. Call bell, personal items and telephone within reach. Instruct patient to call for assistance. Room bathroom lighting operational. Non-slip footwear when patient is off stretcher. Physically safe environment: no spills, clutter or unnecessary equipment. Stretcher in lowest position, wheels locked, appropriate side rails in place.

## 2022-09-13 NOTE — ED PROVIDER NOTE - OBJECTIVE STATEMENT
77 y/o M fell down yesterday and hit his right knee now complaining of pain to the area. Denies Pt denies fevers/chills, headache, chest pain, SOB, abdominal pain, N/V/D, weakness, and numbness. Patient here requesting an X-ray.

## 2022-09-13 NOTE — ED PROVIDER NOTE - CLINICAL SUMMARY MEDICAL DECISION MAKING FREE TEXT BOX
75 y/o M presents for right knee pain after fall yesterday. X-ray done of right knee. X-ray looks normal. Patient appears well and vibrant on discharge.

## 2022-09-13 NOTE — ED ADULT TRIAGE NOTE - CHIEF COMPLAINT QUOTE
c/o pain to right knee s/p trip and fall yesterday. Scabbed abrasion noted to knee. Pt. ambulated in no distress

## 2022-09-13 NOTE — ED ADULT TRIAGE NOTE - HEIGHT IN CM
What Type Of Note Output Would You Prefer (Optional)?: Bullet Format How Severe Is Your Acne?: mild 177.8 Is This A New Presentation, Or A Follow-Up?: Acne Additional Comments (Use Complete Sentences): Only has breakouts during her cycle

## 2022-09-13 NOTE — ED PROVIDER NOTE - PATIENT PORTAL LINK FT
You can access the FollowMyHealth Patient Portal offered by [facility] by registering at the following website: [email or web address]. By joining [employer]’s FollowMyHealth portal, you will also be able to view your health information using other applications (apps) compatible with our system.

## 2023-10-12 ENCOUNTER — EMERGENCY (EMERGENCY)
Facility: HOSPITAL | Age: 78
LOS: 1 days | Discharge: HOME | End: 2023-10-12
Attending: EMERGENCY MEDICINE | Admitting: EMERGENCY MEDICINE
Payer: MEDICARE

## 2023-10-12 VITALS
WEIGHT: 158.07 LBS | DIASTOLIC BLOOD PRESSURE: 67 MMHG | OXYGEN SATURATION: 96 % | TEMPERATURE: 98 F | HEART RATE: 79 BPM | RESPIRATION RATE: 16 BRPM | HEIGHT: 68 IN | SYSTOLIC BLOOD PRESSURE: 100 MMHG

## 2023-10-12 PROCEDURE — 99283 EMERGENCY DEPT VISIT LOW MDM: CPT

## 2023-10-12 RX ADMIN — Medication 100 MILLIGRAM(S): at 16:23

## 2023-10-12 NOTE — ED PROVIDER NOTE - SKIN, MLM
1 cm area of redness in L upper medial buttocks, not extending to anal area, no fluctuance, no induration, no drainage

## 2023-10-12 NOTE — ED ADULT TRIAGE NOTE - CHIEF COMPLAINT QUOTE
Pt with Lt buttock abscess x2 days. Currently on radiation therapy 2/2 rising PSA. Hx prostatectomy.

## 2023-10-12 NOTE — ED PROVIDER NOTE - NSFOLLOWUPINSTRUCTIONS_ED_ALL_ED_FT
Folliculitis  A normal hair follicle compared to one that is infected. The infected follicle is swollen and contains pus.  Folliculitis occurs when hair follicles become inflamed. A hair follicle is a tiny opening in your skin where your hair grows from. This condition often occurs on the scalp, thighs, legs, back, and buttocks but can happen anywhere on the body.    What are the causes?  A common cause of this condition is an infection from bacteria. The type of folliculitis caused by bacteria can last a long time or go away and come back. The bacteria can live anywhere on your skin. They are often found in the nostrils.    Other causes may include:  An infection from a fungus.  An infection from a virus.  Your skin touching some chemicals, such as oils and tars.  Shaving or waxing.  Greasy ointments or creams put on the skin.  What increases the risk?  You are more likely to develop this condition if:  Your body has a weak disease-fighting system (immune system).  You have diabetes.  You are obese.  What are the signs or symptoms?  Symptoms of this condition include:  Redness.  Soreness.  Swelling.  Itching.  Small white or yellow, itchy spots filled with pus (pustules) that appear over a red area. If the infection goes deep into the follicle, these may turn into a boil (furuncle).  A group of boils (carbuncle). These tend to form in hairy, sweaty areas of the body.  How is this diagnosed?  This condition is diagnosed with a skin exam. Your health care provider may take a sample of one of the pustules or boils to test in a lab.    How is this treated?  This condition may be treated by:  Putting a warm, wet cloth (warm compress) on the affected areas.  Taking antibiotics or applying them to the skin.  Applying or bathing with a solution that kills germs (antiseptic).  Taking an over-the-counter medicine. This can help with itching.  Having a procedure to drain pustules or boils. This may be done if a pustule or boil contains a lot of pus or fluid.  Having laser hair removal. This may be done when the condition lasts for a long time.  Follow these instructions at home:  Managing pain and swelling    A heating pad for use on the affected area.  If directed, apply heat to the affected area as often as told by your health care provider. Use the heat source that your health care provider recommends, such as a moist heat pack or a heating pad.  Place a towel between your skin and the heat source.  Leave the heat on for 20–30 minutes.  If your skin turns bright red, remove the heat right away to prevent burns. The risk of burns is higher if you cannot feel pain, heat, or cold.  General instructions    Take over-the-counter and prescription medicines only as told by your health care provider.  If you were prescribed antibiotics, take or apply them as told by your health care provider. Do not stop using the antibiotic even if you start to feel better.  Check your irritated area every day for signs of infection. Check for:  More redness, swelling, or pain.  Fluid or blood.  Warmth.  Pus or a bad smell.  Do not shave irritated skin.  Keep all follow-up visits. Your health care provider will check if the treatments are helping.  Contact a health care provider if:  You have a fever.  You have any signs of infection.  Red streaks are spreading from the affected area.

## 2023-10-12 NOTE — ED PROVIDER NOTE - PATIENT PORTAL LINK FT
You can access the FollowMyHealth Patient Portal offered by [facility] by registering at the following website: [email or web address]. By joining [employer]’s FollowMyHealth portal, you will also be able to view your health information using other applications (apps) compatible with our system.

## 2023-10-12 NOTE — ED PROVIDER NOTE - OBJECTIVE STATEMENT
77-year-old male patient with history of diabetes type 2 on metformin status post prostatectomy but recently elevated PSA levels and getting radiation treatment to the pelvic area.  Patient also being followed up by his hematologist as he has chronic liver disease associated to hep C in the past.  Patient presents for area of discomfort and redness in the left upper buttocks that he noticed a day ago.  He tried some more compresses last night but had some pain while he was sleeping.  He tried squeezing the area as he thought it was a small pimple but he could not see it quite well.  No fever no chills no drainage no rectal pain no difficulty or pain with having bowel movements.  No history of MRSA infections in the past.

## 2023-10-12 NOTE — ED PROVIDER NOTE - CLINICAL SUMMARY MEDICAL DECISION MAKING FREE TEXT BOX
Patient with small area of likely folliculitis that became slightly cellulitic.  On examination there is no drainage no induration no fluctuance.  Very small areas of recommend oral antibiotics.  Patient is well-appearing and has no signs of systemic infectious symptoms.  Will recommend doxycycline for this and encouraged patient to return to emergency room if any worsening symptoms.

## 2023-10-12 NOTE — ED ADULT NURSE NOTE - NSFALLUNIVINTERV_ED_ALL_ED
Bed/Stretcher in lowest position, wheels locked, appropriate side rails in place/Call bell, personal items and telephone in reach/Instruct patient to call for assistance before getting out of bed/chair/stretcher/Non-slip footwear applied when patient is off stretcher/[place] to call system/Physically safe environment - no spills, clutter or unnecessary equipment/Purposeful proactive rounding/Room/bathroom lighting operational, light cord in reach

## 2023-10-16 DIAGNOSIS — Z79.84 LONG TERM (CURRENT) USE OF ORAL HYPOGLYCEMIC DRUGS: ICD-10-CM

## 2023-10-16 DIAGNOSIS — L73.9 FOLLICULAR DISORDER, UNSPECIFIED: ICD-10-CM

## 2023-10-16 DIAGNOSIS — Z90.79 ACQUIRED ABSENCE OF OTHER GENITAL ORGAN(S): ICD-10-CM

## 2023-10-16 DIAGNOSIS — L53.8 OTHER SPECIFIED ERYTHEMATOUS CONDITIONS: ICD-10-CM

## 2023-10-16 DIAGNOSIS — E11.9 TYPE 2 DIABETES MELLITUS WITHOUT COMPLICATIONS: ICD-10-CM

## 2023-10-16 DIAGNOSIS — Z86.19 PERSONAL HISTORY OF OTHER INFECTIOUS AND PARASITIC DISEASES: ICD-10-CM

## 2023-11-01 NOTE — ED ADULT TRIAGE NOTE - NS ED TRIAGE CLINICAL UPGRADE PROVIDER FT
BRANDAN RAYMOND V-Y Plasty Text: The defect edges were debeveled with a #15 scalpel blade. Given the location of the defect, shape of the defect and the proximity to free margins an V-Y advancement flap was deemed most appropriate. Using a sterile surgical marker, an appropriate advancement flap was drawn incorporating the defect and placing the expected incisions within the relaxed skin tension lines where possible. The area thus outlined was incised deep to adipose tissue with a #15 scalpel blade. The skin margins were undermined to an appropriate distance in all directions utilizing iris scissors. Following this, the designed flap was advanced and carried over into the primary defect and sutured into place.

## 2024-08-19 ENCOUNTER — EMERGENCY (EMERGENCY)
Facility: HOSPITAL | Age: 79
LOS: 1 days | Discharge: HOME | End: 2024-08-19
Admitting: EMERGENCY MEDICINE
Payer: MEDICARE

## 2024-08-19 VITALS
SYSTOLIC BLOOD PRESSURE: 93 MMHG | OXYGEN SATURATION: 100 % | RESPIRATION RATE: 16 BRPM | WEIGHT: 149.91 LBS | HEIGHT: 69 IN | TEMPERATURE: 98 F | HEART RATE: 79 BPM | DIASTOLIC BLOOD PRESSURE: 60 MMHG

## 2024-08-19 DIAGNOSIS — M79.674 PAIN IN RIGHT TOE(S): ICD-10-CM
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
## 2024-08-19 DIAGNOSIS — S93.504A UNSPECIFIED SPRAIN OF RIGHT LESSER TOE(S), INITIAL ENCOUNTER: ICD-10-CM

## 2024-08-19 PROCEDURE — 99283 EMERGENCY DEPT VISIT LOW MDM: CPT

## 2024-08-19 PROCEDURE — 73660 X-RAY EXAM OF TOE(S): CPT | Mod: 26,RT

## 2024-08-19 NOTE — ED PROVIDER NOTE - PATIENT PORTAL LINK FT
You can access the FollowMyHealth Patient Portal offered by [facility] by registering at the following website: [email or web address]. By joining [employer]’s FollowMyHealth portal, you will also be able to view your health information using other applications (apps) compatible with our system.

## 2024-08-19 NOTE — ED PROVIDER NOTE - OBJECTIVE STATEMENT
78-year-old male presents today with right second toe pain after stubbing his toe on Friday.  Patient reports has been walking on it since the injury however noticed swelling so wanted to get it checked out.  Denies paresthesias, direct trauma to the toe.  No muscle weakness.

## 2024-08-19 NOTE — ED PROVIDER NOTE - CLINICAL SUMMARY MEDICAL DECISION MAKING FREE TEXT BOX
78-year-old male presents today with right second toe pain after stubbing his toe on Friday.  Patient reports has been walking on it since the injury however noticed swelling so wanted to get it checked out.  PE as above  xr neg for fx   likely ligamentous strain, KATERINE Tx advised  otc apap/motrin prn  pcp f/u  return prec d/w pt

## 2024-08-19 NOTE — ED PROVIDER NOTE - PHYSICAL EXAMINATION
· CONSTITUTIONAL: Well appearing, well nourished, awake, alert, oriented to person, place, time/situation and in no apparent distress.  · HEAD: Head atraumatic, normal cephalic shape.  · HEAD: Head is atraumatic. Head shape is symmetrical.  · CARDIAC: Normal rate, regular rhythm.  Heart sounds S1, S2.  No murmurs, rubs or gallops.  · RESPIRATORY: Breath sounds clear and equal bilaterally.  · GASTROINTESTINAL: Abdomen soft, non-tender, no guarding.  · MUSCULOSKELETAL: RLE- 2nd toe with TTP, pain with ROM and ecchymosis at the DIP. SILT, muscle strength 5/5, DP/PT 2+, cap refill < 3 sec in b/l LE , soft nt compartments   · NEUROLOGICAL: Alert and oriented, no focal deficits, no motor or sensory deficits.  · SKIN: Skin normal color for race, warm, dry and intact. No evidence of rash.  · PSYCHIATRIC: Alert and oriented to person, place, time/situation. normal mood and affect. no apparent risk to self or others.

## 2024-08-19 NOTE — ED PROVIDER NOTE - NSFOLLOWUPINSTRUCTIONS_ED_ALL_ED_FT
A foot sprain is an injury to one of the ligaments in the feet. Ligaments are strong tissues that connect bones to each other. The ligament can be stretched too much. In some cases, it may tear. A tear can be either partial or complete. The severity of the sprain depends on how much of the ligament was damaged or torn.    What are the causes?  This condition is usually caused by suddenly twisting or pivoting your foot.    What increases the risk?  You are more likely to develop this condition if:  You play a sport, such as basketball or football.  You exercise or play a sport without first warming up your muscles.  You start a new workout or sport.  You suddenly increase how long or hard you exercise or play a sport.  You have injured your foot or ankle before.  What are the signs or symptoms?  Symptoms of this condition start soon after an injury and include:  Pain, especially in the arch of your foot.  Bruising.  Swelling.  Being unable to walk or use your foot to support body weight.  How is this diagnosed?  This condition is diagnosed with a medical history and physical exam. You may also have imaging tests, such as:  X-rays to check for broken bones (fractures).  An MRI to see if the ligament is torn.  How is this treated?  Treatment for this condition depends on the severity of the sprain. Mild sprains and major sprains can be treated with:  Rest, ice, pressure (compression), and elevation (RICE). Elevation means raising your injured foot.  Keeping your foot in a fixed position (immobilization) for a period of time. This is done if your ligament is overstretched or partially torn. Your health care provider will apply a bandage, splint, or walking boot to keep your foot from moving until it heals.  Using crutches or a scooter for a few weeks to avoid bearing weight on your foot while it is healing.  Physical therapy exercises to improve movement and strength in your foot.  Major sprains may also be treated with:  Surgery. This is done if your ligament is fully torn and a procedure is needed to reconnect it to the bone.  A cast or splint. This will be needed after surgery. A cast or splint will need to stay on your foot while it heals.  Follow these instructions at home:  If you have a bandage, splint, or boot:    Wear it as told by your health care provider. Remove it only as told by your health care provider.  Loosen it if your toes tingle, become numb, or turn cold and blue.  Keep it clean and dry.  If you have a cast:    Do not put pressure on any part of the cast until it is fully hardened. This may take several hours.  Do not stick anything inside the cast to scratch your skin. Doing that increases your risk for infection.  Check the skin around the cast every day. Tell your health care provider about any concerns.  You may put lotion on dry skin around the edges of the cast. Do not put lotion on the skin underneath the cast.  Keep it clean and dry.  Bathing    Do not take baths, swim, or use a hot tub until your health care provider approves. Ask your health care provider if you may take showers. You may only be allowed to take sponge baths.  If the bandage, splint, boot, or cast is not waterproof:  Do not let it get wet.  Cover it with a watertight covering when you take a bath or shower.  Managing pain, stiffness, and swelling      If directed, put ice on the injured area. To do this:  If you have a removable bandage, splint, or boot, remove it as told by your health care provider.  Put ice in a plastic bag.  Place a towel between your skin and the bag, or between your cast and the bag.  Leave the ice on for 20 minutes, 2–3 times per day.  Remove the ice if your skin turns bright red. This is very important. If you cannot feel pain, heat, or cold, you have a greater risk of damage to the area.  Move your toes often to reduce stiffness and swelling.  Elevate the injured area above the level of your heart while you are sitting or lying down.  Activity    Do not use the injured foot to support your body weight until your health care provider says that you can. Use crutches or a scooter as told by your health care provider.  Ask your health care provider what activities are safe for you. Do exercises as told by your health care provider.  Gradually increase how much and how far you walk until your health care provider says it is safe to return to full activity.  Driving    Ask your health care provider if the medicine prescribed to you requires you to avoid driving or using machinery.  Ask your health care provider when it is safe to drive if you have a bandage, splint, boot, or cast on your foot.  General instructions    Take over-the-counter and prescription medicines only as told by your health care provider.  When you can walk without pain, wear supportive shoes that have stiff soles. Do not wear flip-flops. Do not walk barefoot.  Keep all follow-up visits. This is important.  Contact a health care provider if:  Medicine does not help your pain.  Your bruising or swelling gets worse or does not get better with treatment.  Your splint, boot, or cast is damaged.  Get help right away if:  You develop severe numbness or tingling in your foot.  Your foot turns blue, white, or gray, and it feels cold.  Summary  A foot sprain is an injury to one of the ligaments in the feet. Ligaments are strong tissues that connect bones to each other.  You may need a bandage, splint, boot, or cast to support your foot while it heals. Sometimes, surgery may be needed.  You may need physical therapy exercises to improve movement and strength in your foot.

## 2024-10-17 ENCOUNTER — EMERGENCY (EMERGENCY)
Age: 79
LOS: 1 days | Discharge: HOME | End: 2024-10-17
Admitting: EMERGENCY MEDICINE
Payer: MEDICARE

## 2024-10-17 VITALS
SYSTOLIC BLOOD PRESSURE: 110 MMHG | TEMPERATURE: 98 F | OXYGEN SATURATION: 98 % | HEART RATE: 72 BPM | DIASTOLIC BLOOD PRESSURE: 70 MMHG | RESPIRATION RATE: 16 BRPM | HEIGHT: 69 IN

## 2024-10-17 DIAGNOSIS — M79.604 PAIN IN RIGHT LEG: ICD-10-CM

## 2024-10-17 DIAGNOSIS — Z85.46 PERSONAL HISTORY OF MALIGNANT NEOPLASM OF PROSTATE: ICD-10-CM
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
## 2024-10-17 DIAGNOSIS — M25.561 PAIN IN RIGHT KNEE: ICD-10-CM

## 2024-10-17 DIAGNOSIS — S80.211A ABRASION, RIGHT KNEE, INITIAL ENCOUNTER: ICD-10-CM

## 2024-10-17 DIAGNOSIS — Z23 ENCOUNTER FOR IMMUNIZATION: ICD-10-CM

## 2024-10-17 DIAGNOSIS — E11.9 TYPE 2 DIABETES MELLITUS WITHOUT COMPLICATIONS: ICD-10-CM

## 2024-10-17 PROCEDURE — 99284 EMERGENCY DEPT VISIT MOD MDM: CPT

## 2024-10-17 PROCEDURE — 73562 X-RAY EXAM OF KNEE 3: CPT | Mod: 26,RT

## 2024-10-17 RX ORDER — IBUPROFEN 200 MG
400 TABLET ORAL ONCE
Refills: 0 | Status: COMPLETED | OUTPATIENT
Start: 2024-10-17 | End: 2024-10-17

## 2024-11-03 ENCOUNTER — EMERGENCY (EMERGENCY)
Facility: HOSPITAL | Age: 79
LOS: 1 days | Discharge: HOME | End: 2024-11-03
Attending: EMERGENCY MEDICINE | Admitting: EMERGENCY MEDICINE
Payer: MEDICARE

## 2024-11-03 VITALS
WEIGHT: 149.91 LBS | DIASTOLIC BLOOD PRESSURE: 75 MMHG | SYSTOLIC BLOOD PRESSURE: 107 MMHG | HEART RATE: 72 BPM | HEIGHT: 69 IN | TEMPERATURE: 99 F | RESPIRATION RATE: 18 BRPM | OXYGEN SATURATION: 97 %

## 2024-11-03 PROCEDURE — 73660 X-RAY EXAM OF TOE(S): CPT | Mod: 26,LT

## 2024-11-03 PROCEDURE — 93971 EXTREMITY STUDY: CPT | Mod: 26,RT

## 2024-11-03 PROCEDURE — 99284 EMERGENCY DEPT VISIT MOD MDM: CPT

## 2024-11-03 NOTE — ED ADULT NURSE NOTE - NSFALLRISKINTERV_ED_ALL_ED

## 2024-11-03 NOTE — ED ADULT TRIAGE NOTE - CHIEF COMPLAINT QUOTE
Pt walk in c/o L great toe pain x 1 week 2/2 stubbing injury. States pain and swelling has worsened since. Ambulates steadily. Denies fevers or purulence. H.o DM and cirrhosis.

## 2024-11-03 NOTE — ED PROVIDER NOTE - NSFOLLOWUPCLINICS_GEN_ALL_ED_FT
[facility] - Podiatry Clinic  Podiatry  [address] [place] [address]  Phone: [phone]  Fax:

## 2024-11-03 NOTE — ED PROVIDER NOTE - CLINICAL SUMMARY MEDICAL DECISION MAKING FREE TEXT BOX
Pt walk in c/o L great toe pain x 1 week 2/2 stubbing injury. States pain and swelling has worsened since. Ambulates steadily. Denies fevers or purulence. H.o DM and cirrhosis.  toe pain  Also states he has R calf pain x weeks.  Will XR toe and sono calf.    Sono neg, xr no acute fx appreciated.

## 2024-11-03 NOTE — ED PROVIDER NOTE - PATIENT PORTAL LINK FT
You can access the FollowMyHealth Patient Portal offered by [facility] by registering at the following website: [email or web address]. By joining [employer]’s FollowMyHealth portal, you will also be able to view your health information using other applications (apps) compatible with our system.

## 2024-11-03 NOTE — ED PROVIDER NOTE - OBJECTIVE STATEMENT
Pt walk in c/o L great toe pain x 1 week 2/2 stubbing injury. States pain and swelling has worsened since. Ambulates steadily. Denies fevers or purulence. H.o DM and cirrhosis.  toe pain  Also states he has R calf pain x weeks.  Will XR toe and sono calf.

## 2024-11-03 NOTE — ED PROVIDER NOTE - NSFOLLOWUPINSTRUCTIONS_ED_ALL_ED_FT
Follow up with podiatry and your primary care this week.  We did not see a fracture (break) on the toe today and the radiologist will post their official reading of the xray in 24-48 hours.  Please check the portal for the results because there is always a small chance they see something we could not.      Wear the orthopedic shoe as tolerated.     Your ultrasound was normal today.    Foot Contusion  A foot contusion is a deep bruise to the foot. Contusions happen when you get an injury to soft tissues, such as the muscle fibers under your skin. The injury causes bleeding under the skin.    The skin over a contusion may change colors more than once while the foot heals. Minor injuries will cause a painless contusion. More severe contusions may be painful and swollen for a few weeks.    What are the causes?  A foot contusion is often caused by a hard hit or direct force to your foot. This may be from having a heavy object fall on your foot.    What are the signs or symptoms?  A deep bruise on the foot.  Symptoms of a foot contusion include:  Swelling of the foot.  Pain and tenderness.  Changes in skin color. The skin may turn from blue to purple to yellow.  How is this diagnosed?  A foot contusion may be diagnosed based on your medical history and a physical exam. You may also need tests to check for other injuries. These may include:  An X-ray. This may be done to check for broken bones (fractures).  A CT scan or MRI. This may be done to see if the tissues that connect bones to each other (ligaments) have been torn or injured.  How is this treated?  The best treatment for a foot contusion is rest, ice, pressure (compression), and raising (elevating) the injured foot. This is called RICE therapy.    You may be given:  An elastic wrap to support and compress your foot.  Medicines to help relieve pain.  Crutches, if your swelling or pain is severe.  Follow these instructions at home:  RICE therapy    Bag of ice on a towel on the skin.  Rest the injured area. Try to avoid standing or walking while your foot is painful.  If told, put ice on the injured area.  Put ice in a plastic bag.  Place a towel between your skin and the bag.  Leave the ice on for 20 minutes, 2–3 times a day.  If your skin turns bright red, remove the ice right away to prevent skin damage. The risk of damage is higher if you cannot feel pain, heat, or cold.  If told, use an elastic wrap to apply light compression to the injured foot. Make sure the wrap is not too tight. Remove and reapply it as told by your health care provider. If your toes become numb or turn cold and blue, take the wrap off and put it on more loosely.  Elevate the injured foot above the level of your heart while you are sitting or lying down.  You may also move your toes often to reduce stiffness and swelling.  General instructions    Take over-the-counter and prescription medicines only as told by your provider.  Do not use the injured foot to support your body weight until your provider says that you can. Use crutches as told by your provider.  Do not use any products that contain nicotine or tobacco. These products include cigarettes, chewing tobacco, and vaping devices, such as e-cigarettes. These can delay healing. If you need help quitting, ask your provider.  Keep all follow-up visits. Your provider will check how your foot is healing.  Contact a health care provider if:  Your symptoms do not get better after several days of treatment.  You have more redness, swelling, or pain in your foot or toes.  You have more trouble moving your injured foot.  The swelling or pain does not get better with medicine.  Get help right away if:  You have severe pain.  Your foot or toes become numb, and the numbness does not go away when you remove the wrap, move, and elevate your foot.  Your foot or toes become pale or cold.  You cannot move your foot or ankle.

## 2024-11-07 DIAGNOSIS — M79.675 PAIN IN LEFT TOE(S): ICD-10-CM

## 2024-11-07 DIAGNOSIS — M79.662 PAIN IN LEFT LOWER LEG: ICD-10-CM
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
## 2024-11-07 DIAGNOSIS — S90.112A CONTUSION OF LEFT GREAT TOE WITHOUT DAMAGE TO NAIL, INITIAL ENCOUNTER: ICD-10-CM

## 2024-12-21 ENCOUNTER — EMERGENCY (EMERGENCY)
Facility: HOSPITAL | Age: 79
LOS: 1 days | Discharge: HOME | End: 2024-12-21
Admitting: STUDENT IN AN ORGANIZED HEALTH CARE EDUCATION/TRAINING PROGRAM
Payer: MEDICARE

## 2024-12-21 VITALS
SYSTOLIC BLOOD PRESSURE: 110 MMHG | TEMPERATURE: 99 F | OXYGEN SATURATION: 99 % | WEIGHT: 160.06 LBS | RESPIRATION RATE: 18 BRPM | HEART RATE: 63 BPM | DIASTOLIC BLOOD PRESSURE: 72 MMHG

## 2024-12-21 DIAGNOSIS — E78.5 HYPERLIPIDEMIA, UNSPECIFIED: ICD-10-CM
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
## 2024-12-21 DIAGNOSIS — M79.671 PAIN IN RIGHT FOOT: ICD-10-CM

## 2024-12-21 DIAGNOSIS — E11.9 TYPE 2 DIABETES MELLITUS WITHOUT COMPLICATIONS: ICD-10-CM

## 2024-12-21 DIAGNOSIS — Z87.19 PERSONAL HISTORY OF OTHER DISEASES OF THE DIGESTIVE SYSTEM: ICD-10-CM

## 2024-12-21 DIAGNOSIS — S92.511A DISPLACED FRACTURE OF PROXIMAL PHALANX OF RIGHT LESSER TOE(S), INITIAL ENCOUNTER FOR CLOSED FRACTURE: ICD-10-CM
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
## 2024-12-21 PROCEDURE — 73630 X-RAY EXAM OF FOOT: CPT | Mod: 26,RT

## 2024-12-21 PROCEDURE — 99284 EMERGENCY DEPT VISIT MOD MDM: CPT

## 2024-12-21 RX ORDER — NAPROXEN SODIUM 275 MG
500 TABLET ORAL ONCE
Refills: 0 | Status: COMPLETED | OUTPATIENT
Start: 2024-12-21 | End: 2024-12-21

## 2024-12-21 RX ADMIN — Medication 500 MILLIGRAM(S): at 16:20

## 2024-12-21 NOTE — ED ADULT NURSE NOTE - OBJECTIVE STATEMENT
Pt presents to ED A&Ox4 with c/o right 4th toe pain. Pt reports dropping shoe on affected area prior to onset of pain. Pt able to ambulate with steady gait and maintains motor and sensory function to area.

## 2024-12-21 NOTE — ED PROVIDER NOTE - PROGRESS NOTE DETAILS
XR with fx proximal to PIP joint. However, injury occurred several weeks ago, no acute or urgent intervention indicated. Will give hard sole shoe and have pt f/u with his podiatrist. Pt understands and agrees with current plan. Will d/c with strict return precautions.

## 2024-12-21 NOTE — ED PROVIDER NOTE - OBJECTIVE STATEMENT
78 y/o M with pmhx of DM, HLD,  liver cirrhosis who presents to the ED c/o pain to 4th digit on right foot. Two to three weeks ago, pt states he dropped his sneaker onto his toes on the right foot. Since then he has been having some pain with walking and a burning sensation to the toe at night. Pt was unable to see his podiatrist and so he came to the ED for evaluation. Denies difficulty with ambulation, extremity paresthesias/weakness, overlying wounds, redness over the toe. NKDA. No other complaints at this time.

## 2024-12-21 NOTE — ED PROVIDER NOTE - CLINICAL SUMMARY MEDICAL DECISION MAKING FREE TEXT BOX
80 y/o M with pmhx of DM, HLD,  liver cirrhosis who presents to the ED c/o pain to 4th digit on right foot. Two to three weeks ago, pt states he dropped his sneaker onto his toes on the right foot. Since then he has been having some pain with walking and a burning sensation to the toe at night. Pt was unable to see his podiatrist and so he came to the ED for evaluation. Denies difficulty with ambulation, extremity paresthesias/weakness, overlying wounds, redness over the toe. NKDA. No other complaints at this time.     Patient currently afebrile, hemodynamically stable, spO2 100%. Based on history and physical, differentials include but are not limited to fx vs strain vs contusion. Plan to assess patient for acute pathology as listed above with XR. Will administer medications for symptomatic relief, follow-up on results, and reassess. =

## 2024-12-21 NOTE — ED PROVIDER NOTE - NSFOLLOWUPINSTRUCTIONS_ED_ALL_ED_FT
You were seen in the ED for right fourth toe pain. Your X-ray shows signs of a fracture in this toe.     Please follow-up with your podiatrist for further evaluation and management.     Keep foot in a hard sole shoe as provided in the ED.   Keep extremity elevated and wrapped.  Apply cool compresses to affected area for 15 minutes, 3-4 times per day.    For pain, take Naproxen 500mg tablet TWICE a day as needed for pain.     Return to the ED for any new or worsening symptoms including worsening pain or swelling or an inability to walk, numbness/tingling in the foot, fever/chills.

## 2024-12-21 NOTE — ED PROVIDER NOTE - PATIENT PORTAL LINK FT
You can access the FollowMyHealth Patient Portal offered by [facility] by registering at the following website: [email or web address]. By joining [email or web address]’s FollowMyHealth portal, you will also be able to view your health information using other applications (apps) compatible with our system.

## 2024-12-21 NOTE — ED ADULT NURSE NOTE - NS ED NOTE ABUSE SUSPICION NEGLECT YN
Refill requested for:     Alprazolam 0.5mg    Last filled on:     5/10/17  Last office visit:     5/15/17  Pending office visit None    Medication can not be filled by protocol. Physician authorization required.    Patient was to    Return in about 1 month (around 6/15/2017) for med recheck.      No

## 2024-12-21 NOTE — ED PROVIDER NOTE - PHYSICAL EXAMINATION
General: Well appearing in no acute distress, alert and cooperative  Cardiac: Regular rate and regular rhythm, no murmurs  Resp: Unlabored respiratory effort, lungs CTAB, speaking in full sentences, no wheezes  MSK: +tenderness to palpation over PIP joint on 4th digit of right foot with no overlying swelling, erythema, open wounds. No signs of infection in between the toes of right foot. DP pulses 2+ and symmetric in b/l LE. Sensation grossly intact. Motor and strength 5/5. No tenderness to palpation over rest of right foot or right ankle.   Skin: Warm and dry, no rashes/abrasions/lacerations  Neuro: AO x 3, moves all extremities symmetrically, Motor strength 5/5 bilaterally UE and LE, sensation grossly intact. Normal gait.

## 2024-12-21 NOTE — ED ADULT NURSE NOTE - CHIEF COMPLAINT QUOTE
rt foot 4th toe injury s/p dropping a shoe on it  last week. swelling and burning sensation. pt is diabetic

## 2025-01-31 ENCOUNTER — EMERGENCY (EMERGENCY)
Facility: HOSPITAL | Age: 80
LOS: 1 days | Discharge: HOME | End: 2025-01-31
Attending: STUDENT IN AN ORGANIZED HEALTH CARE EDUCATION/TRAINING PROGRAM | Admitting: STUDENT IN AN ORGANIZED HEALTH CARE EDUCATION/TRAINING PROGRAM
Payer: MEDICARE

## 2025-01-31 VITALS
OXYGEN SATURATION: 99 % | RESPIRATION RATE: 18 BRPM | DIASTOLIC BLOOD PRESSURE: 73 MMHG | TEMPERATURE: 97 F | SYSTOLIC BLOOD PRESSURE: 104 MMHG | WEIGHT: 154.1 LBS | HEART RATE: 75 BPM | HEIGHT: 68.5 IN

## 2025-01-31 PROBLEM — E78.5 HYPERLIPIDEMIA, UNSPECIFIED: Chronic | Status: ACTIVE | Noted: 2024-12-21

## 2025-01-31 PROBLEM — E11.9 TYPE 2 DIABETES MELLITUS WITHOUT COMPLICATIONS: Chronic | Status: ACTIVE | Noted: 2024-12-21

## 2025-01-31 LAB
ALBUMIN SERPL ELPH-MCNC: 3.9 G/DL — SIGNIFICANT CHANGE UP (ref 3.4–5)
ALP SERPL-CCNC: 75 U/L — SIGNIFICANT CHANGE UP (ref 40–120)
ALT FLD-CCNC: 25 U/L — SIGNIFICANT CHANGE UP (ref 12–42)
ANION GAP SERPL CALC-SCNC: 8 MMOL/L — LOW (ref 9–16)
AST SERPL-CCNC: 20 U/L — SIGNIFICANT CHANGE UP (ref 15–37)
BASOPHILS # BLD AUTO: 0.03 K/UL — SIGNIFICANT CHANGE UP (ref 0–0.2)
BASOPHILS NFR BLD AUTO: 0.6 % — SIGNIFICANT CHANGE UP (ref 0–2)
BILIRUB SERPL-MCNC: 0.7 MG/DL — SIGNIFICANT CHANGE UP (ref 0.2–1.2)
BUN SERPL-MCNC: 14 MG/DL — SIGNIFICANT CHANGE UP (ref 7–23)
CALCIUM SERPL-MCNC: 9.7 MG/DL — SIGNIFICANT CHANGE UP (ref 8.5–10.5)
CHLORIDE SERPL-SCNC: 105 MMOL/L — SIGNIFICANT CHANGE UP (ref 96–108)
CO2 SERPL-SCNC: 28 MMOL/L — SIGNIFICANT CHANGE UP (ref 22–31)
CREAT SERPL-MCNC: 0.79 MG/DL — SIGNIFICANT CHANGE UP (ref 0.5–1.3)
EGFR: 92 ML/MIN/1.73M2 — SIGNIFICANT CHANGE UP
EOSINOPHIL # BLD AUTO: 0.22 K/UL — SIGNIFICANT CHANGE UP (ref 0–0.5)
EOSINOPHIL NFR BLD AUTO: 4.1 % — SIGNIFICANT CHANGE UP (ref 0–6)
GLUCOSE SERPL-MCNC: 137 MG/DL — HIGH (ref 70–99)
HCT VFR BLD CALC: 41.8 % — SIGNIFICANT CHANGE UP (ref 39–50)
HGB BLD-MCNC: 13.7 G/DL — SIGNIFICANT CHANGE UP (ref 13–17)
IMM GRANULOCYTES NFR BLD AUTO: 0.2 % — SIGNIFICANT CHANGE UP (ref 0–0.9)
LYMPHOCYTES # BLD AUTO: 0.97 K/UL — LOW (ref 1–3.3)
LYMPHOCYTES # BLD AUTO: 18.2 % — SIGNIFICANT CHANGE UP (ref 13–44)
MCHC RBC-ENTMCNC: 29.1 PG — SIGNIFICANT CHANGE UP (ref 27–34)
MCHC RBC-ENTMCNC: 32.8 G/DL — SIGNIFICANT CHANGE UP (ref 32–36)
MCV RBC AUTO: 88.9 FL — SIGNIFICANT CHANGE UP (ref 80–100)
MONOCYTES # BLD AUTO: 0.43 K/UL — SIGNIFICANT CHANGE UP (ref 0–0.9)
MONOCYTES NFR BLD AUTO: 8.1 % — SIGNIFICANT CHANGE UP (ref 2–14)
NEUTROPHILS # BLD AUTO: 3.68 K/UL — SIGNIFICANT CHANGE UP (ref 1.8–7.4)
NEUTROPHILS NFR BLD AUTO: 68.8 % — SIGNIFICANT CHANGE UP (ref 43–77)
NRBC # BLD: 0 /100 WBCS — SIGNIFICANT CHANGE UP (ref 0–0)
NRBC BLD-RTO: 0 /100 WBCS — SIGNIFICANT CHANGE UP (ref 0–0)
NT-PROBNP SERPL-SCNC: 28 PG/ML — SIGNIFICANT CHANGE UP
PLATELET # BLD AUTO: 155 K/UL — SIGNIFICANT CHANGE UP (ref 150–400)
POTASSIUM SERPL-MCNC: 3.9 MMOL/L — SIGNIFICANT CHANGE UP (ref 3.5–5.3)
POTASSIUM SERPL-SCNC: 3.9 MMOL/L — SIGNIFICANT CHANGE UP (ref 3.5–5.3)
PROT SERPL-MCNC: 7.8 G/DL — SIGNIFICANT CHANGE UP (ref 6.4–8.2)
RBC # BLD: 4.7 M/UL — SIGNIFICANT CHANGE UP (ref 4.2–5.8)
RBC # FLD: 13.2 % — SIGNIFICANT CHANGE UP (ref 10.3–14.5)
SODIUM SERPL-SCNC: 141 MMOL/L — SIGNIFICANT CHANGE UP (ref 132–145)
TROPONIN I, HIGH SENSITIVITY RESULT: 6.3 NG/L — SIGNIFICANT CHANGE UP
TROPONIN I, HIGH SENSITIVITY RESULT: 6.5 NG/L — SIGNIFICANT CHANGE UP
WBC # BLD: 5.34 K/UL — SIGNIFICANT CHANGE UP (ref 3.8–10.5)
WBC # FLD AUTO: 5.34 K/UL — SIGNIFICANT CHANGE UP (ref 3.8–10.5)

## 2025-01-31 PROCEDURE — 99285 EMERGENCY DEPT VISIT HI MDM: CPT

## 2025-01-31 PROCEDURE — 71046 X-RAY EXAM CHEST 2 VIEWS: CPT | Mod: 26

## 2025-01-31 NOTE — ED PROVIDER NOTE - NSFOLLOWUPINSTRUCTIONS_ED_ALL_ED_FT
Please help with your primary care doctor, your pulmonologist, and your gastroenterologist as previously indicated.  Return to the emergency room if you develop new concerning symptoms.  We did not find any emergency causes of your chest discomfort.

## 2025-01-31 NOTE — ED ADULT NURSE NOTE - OBJECTIVE STATEMENT
pt c/o intermittent midsternal chest pressure x 3 days. denies SOB, denies dizziness, denies cough, denies fever. a+ox4, resp even and unlabored, ambulatory with steady gait.

## 2025-01-31 NOTE — ED PROVIDER NOTE - CLINICAL SUMMARY MEDICAL DECISION MAKING FREE TEXT BOX
chest pain workup including ACS rule out with EKG, troponins, basic labs rule out electrolyte or metabolic abnormality, chest x-ray to rule out unlikely pneumonia.      Workup negative for emergent pathology, patient continues to be asymptomatic.  Unlikely cardiac etiology of his symptoms especially given recent normal stress test, will discharge patient outpatient follow-up and give strict return precautions.

## 2025-01-31 NOTE — ED PROVIDER NOTE - CHIEF COMPLAINT
Patient instructions:    * Continue all of your current medications.    * Follow a heart healthy, low-fat, low-cholesterol diet.    * Exercise for a minimum of 30 minutes at least 4-5 times a week as tolerated.    * Keep up with all the appointments       
The patient is a 79y Male complaining of chest pain.

## 2025-01-31 NOTE — ED ADULT TRIAGE NOTE - CHIEF COMPLAINT QUOTE
Pt walks in c/o intermittent chest tightness for several days. Pt states pain is worse when he wakes up after lying flat.

## 2025-01-31 NOTE — ED ADULT NURSE NOTE - NSFALLUNIVINTERV_ED_ALL_ED
Bed/Stretcher in lowest position, wheels locked, appropriate side rails in place/Call bell, personal items and telephone in reach/Instruct patient to call for assistance before getting out of bed/chair/stretcher/Non-slip footwear applied when patient is off stretcher/[place] to call system/Physically safe environment - no spills, clutter or unnecessary equipment/Purposeful proactive rounding/Room/bathroom lighting operational, light cord in reach

## 2025-01-31 NOTE — ED PROVIDER NOTE - PHYSICAL EXAMINATION
Constitutional: awake and alert, in no acute distress  HEENT: head normocephalic and atraumatic. moist mucous membranes  Eyes: extraocular movements intact, normal conjunctiva  Neck: supple, normal ROM  Cardiovascular: regular rate   Pulmonary: no respiratory distress  Gastrointestinal: abdomen flat and nondistended  Skin: warm, dry, normal for ethnicity  Musculoskeletal: no edema, no deformity  Neurological: oriented x4, no focal neurologic deficit.   Psychiatric: calm and cooperative     lungs CTAB, heart regular rate and rhythm

## 2025-01-31 NOTE — ED PROVIDER NOTE - PATIENT PORTAL LINK FT
You can access the FollowMyHealth Patient Portal offered by [facility] by registering at the following website: [email or web address]. By joining [employer]’s FollowMyHealth portal, you will also be able to view your health information using other applications (apps) compatible with our system.

## 2025-02-04 DIAGNOSIS — E11.9 TYPE 2 DIABETES MELLITUS WITHOUT COMPLICATIONS: ICD-10-CM

## 2025-02-04 DIAGNOSIS — R07.89 OTHER CHEST PAIN: ICD-10-CM

## 2025-02-04 DIAGNOSIS — E78.5 HYPERLIPIDEMIA, UNSPECIFIED: ICD-10-CM

## 2025-02-12 NOTE — ED ADULT NURSE NOTE - IS THE PATIENT ABLE TO BE SCREENED?
Con is growing and developing well. Use helmets whenever riding bikes or scooters. In the car, the safest guidelines recommend using a booster seat until your child is 57 inches tall.  At a minimum, use a booster seat until 8 years and 80 pounds in weight to be in compliance with state law.  We discussed physical activity and nutritional requirements for your child today.  Con should return annually for a checkup.     Early potential viral symptoms but no fever at this point, seems well right now.  Ok for scool flu testing negative this morning. Viral syndrome.  We will plan for symptomatic care with ibuprofen, acetaminophen, fluids, and humidity.  Fevers if present can last 4-5 days total and congestion and coughing will likely last longer, sometimes up to 2 weeks total. Call back for increasing or new fevers, worsening or new symptoms such as ear pain or trouble breathing, or no improvement.     Con has symptoms consistent with constipation.      Next option is to do miralax powder.  For children older than 2, start with 1 capful daily. Increase it to two or three times daily if needed until Con is having a soft bowel movement daily.  (Should be soft and easy to pass).  You can decrease it to half a capful daily if needed for diarrhea. Keep on the miralax for 2-3 months and then if you want to try off of it that is fine. If the constipation comes back, it is safe to be on Miralax in the long term if needed.   Using the diaper for now is fine to promote regularity, and the hope is that as it gets easier to pass and less positioning and struggle involved, eventually he will be more comfortable going on the potty. If you have problems or questions call back rather than just stopping the medicine.     
Yes

## 2025-03-16 NOTE — ED ADULT TRIAGE NOTE - NS ED NURSE BANDS TYPE
Name band; Patient requests all Lab, Cardiology, and Radiology Results on their Discharge Instructions

## 2025-03-17 NOTE — ED PROVIDER NOTE - RADIATION
The patient is Stable - Low risk of patient condition declining or worsening    Shift Goals  Clinical Goals: Titrate O2 as needed; Monitor intake/output  Patient Goals: TOBIN; Toddler  Family Goals: Remain updated on POC    Progress made toward(s) clinical / shift goals:  Patient remained on 1L O2 this shift due to intermittent periods of increased WOB. Patient received 1 dose of PRN Tylenol and 1 dose of PRN Motrin this shift for pain. Patient had adequate PO intake and output throughout this shift.    Patient is not progressing towards the following goals: N/A at this time    Problem: Knowledge Deficit - Standard  Goal: Patient and family/care givers will demonstrate understanding of plan of care, disease process/condition, diagnostic tests and medications  Description: Target End Date:  1-3 days or as soon as patient condition allowsDocument in Patient Education1.  Patient and family/caregiver oriented to unit, equipment, visitation policy and means for communicating concern2.  Complete/review Learning Assessment3.  Assess knowledge level of disease process/condition, treatment plan, diagnostic tests and medications4.  Explain disease process/condition, treatment plan, diagnostic tests and medications  Outcome: Progressing          no radiation